# Patient Record
Sex: FEMALE | Race: WHITE | NOT HISPANIC OR LATINO
[De-identification: names, ages, dates, MRNs, and addresses within clinical notes are randomized per-mention and may not be internally consistent; named-entity substitution may affect disease eponyms.]

---

## 2024-01-25 ENCOUNTER — APPOINTMENT (OUTPATIENT)
Dept: OBGYN | Facility: CLINIC | Age: 49
End: 2024-01-25
Payer: COMMERCIAL

## 2024-01-25 VITALS
SYSTOLIC BLOOD PRESSURE: 133 MMHG | BODY MASS INDEX: 35.03 KG/M2 | HEIGHT: 66 IN | DIASTOLIC BLOOD PRESSURE: 86 MMHG | HEART RATE: 80 BPM | WEIGHT: 218 LBS

## 2024-01-25 DIAGNOSIS — Z80.0 FAMILY HISTORY OF MALIGNANT NEOPLASM OF DIGESTIVE ORGANS: ICD-10-CM

## 2024-01-25 DIAGNOSIS — Z87.891 PERSONAL HISTORY OF NICOTINE DEPENDENCE: ICD-10-CM

## 2024-01-25 DIAGNOSIS — Z80.3 FAMILY HISTORY OF MALIGNANT NEOPLASM OF BREAST: ICD-10-CM

## 2024-01-25 DIAGNOSIS — Z80.6 FAMILY HISTORY OF LEUKEMIA: ICD-10-CM

## 2024-01-25 DIAGNOSIS — E66.9 OBESITY, UNSPECIFIED: ICD-10-CM

## 2024-01-25 DIAGNOSIS — Z13.71 ENCOUNTER FOR NONPROCREATIVE SCREENING FOR GENETIC DISEASE CARRIER STATUS: ICD-10-CM

## 2024-01-25 PROBLEM — Z00.00 ENCOUNTER FOR PREVENTIVE HEALTH EXAMINATION: Status: ACTIVE | Noted: 2024-01-25

## 2024-01-25 PROCEDURE — 99204 OFFICE O/P NEW MOD 45 MIN: CPT

## 2024-03-04 ENCOUNTER — APPOINTMENT (OUTPATIENT)
Dept: OBGYN | Facility: CLINIC | Age: 49
End: 2024-03-04
Payer: COMMERCIAL

## 2024-03-04 PROCEDURE — 99213 OFFICE O/P EST LOW 20 MIN: CPT

## 2024-03-04 NOTE — DISCUSSION/SUMMARY
[FreeTextEntry1] : 49 yo P2 here for preop chat before to lsc b/l ov cystectomy and b/l salpingectomy. Case reviewed. Spoke about likelihood of removing one ovary and doing cystectomy on the contralateral side, assuming no suspicious findings. Would still do the b/l salpingectomy. Questions answered.  Plan Keep appt for surgery and PST

## 2024-03-04 NOTE — HISTORY OF PRESENT ILLNESS
[Other Location: e.g. School (Enter Location, City,State)___] : at [unfilled], at the time of the visit. [Medical Office: ([facility])___] : at the medical office located in  [Verbal consent obtained from patient] : the patient, [unfilled] [FreeTextEntry1] : 47 yo P2 here for preop chat before to lsc b/l ov cystectomy and b/l salpingectomy.

## 2024-03-06 ENCOUNTER — OUTPATIENT (OUTPATIENT)
Dept: OUTPATIENT SERVICES | Facility: HOSPITAL | Age: 49
LOS: 1 days | End: 2024-03-06

## 2024-03-06 VITALS
OXYGEN SATURATION: 99 % | SYSTOLIC BLOOD PRESSURE: 108 MMHG | HEART RATE: 76 BPM | DIASTOLIC BLOOD PRESSURE: 74 MMHG | HEIGHT: 65 IN | TEMPERATURE: 98 F | RESPIRATION RATE: 16 BRPM | WEIGHT: 205.91 LBS

## 2024-03-06 DIAGNOSIS — Z98.890 OTHER SPECIFIED POSTPROCEDURAL STATES: Chronic | ICD-10-CM

## 2024-03-06 DIAGNOSIS — N83.201 UNSPECIFIED OVARIAN CYST, RIGHT SIDE: ICD-10-CM

## 2024-03-06 DIAGNOSIS — Z98.51 TUBAL LIGATION STATUS: Chronic | ICD-10-CM

## 2024-03-06 LAB
BLD GP AB SCN SERPL QL: NEGATIVE — SIGNIFICANT CHANGE UP
RH IG SCN BLD-IMP: POSITIVE — SIGNIFICANT CHANGE UP

## 2024-03-06 RX ORDER — SODIUM CHLORIDE 9 MG/ML
1000 INJECTION, SOLUTION INTRAVENOUS
Refills: 0 | Status: DISCONTINUED | OUTPATIENT
Start: 2024-03-12 | End: 2024-03-26

## 2024-03-06 NOTE — H&P PST ADULT - PROBLEM SELECTOR PLAN 1
Patient tentatively scheduled for laparoscopic bilateral ovarian cystectomy on 3/12/24.  Pre-op instructions provided. Pt given verbal and written instructions with teach back on chlorhexidine wash and pepcid. Pt verbalized understanding with return demonstration.     CBC, CMP in chart from 1/12/24 - WNL  Recent Echo and stress results requested.

## 2024-03-06 NOTE — H&P PST ADULT - PROBLEM SELECTOR PLAN 1
Patient tentatively scheduled for laparoscopic bilateral ovarian cystectomy on 3/12/24.  Pre-op instructions provided. Pt given verbal and written instructions with teach back on chlorhexidine wash and pepcid. Pt verbalized understanding with return demonstration.   Urine cup provided for day of procedure for pregnancy test.

## 2024-03-06 NOTE — H&P PST ADULT - HISTORY OF PRESENT ILLNESS
49 year old female with PMH of HTN, HLD, Type 2 DM, hypothyroidism, Asthma, COPD , CAD, presents to Presurgical testing with diagnosis of unspecified ovarian cyst, right side   scheduled for laparoscopic bilateral ovarian cystectomy.     USG finding of ovarian cyst. (Rt side looked "dense").  MRI showed b/l ovaries w/densities. 49 year old female with no PMH presents to Presurgical testing with diagnosis of unspecified ovarian cyst, right side  scheduled for laparoscopic bilateral ovarian cystectomy.     USG finding of ovarian cyst. (Rt side looked "dense").  MRI showed b/l ovaries w/densities.

## 2024-03-06 NOTE — H&P PST ADULT - HISTORY OF PRESENT ILLNESS
48 year old female with no PMH presents to Presurgical testing with diagnosis of unspecified ovarian cyst , right side scheduled for laparoscopic bilateral ovarian cystectomy.     USG finding of ovarian cyst. (Rt side looked "dense").  MRI showed b/l ovaries w/densities.

## 2024-03-06 NOTE — H&P PST ADULT - NSICDXPASTSURGICALHX_GEN_ALL_CORE_FT
PAST SURGICAL HISTORY:  H/O eye surgery     H/O tubal ligation     History of endometrial ablation

## 2024-03-06 NOTE — H&P PST ADULT - NSANTHOSAYNRD_GEN_A_CORE
No. LISBET screening performed.  STOP BANG Legend: 0-2 = LOW Risk; 3-4 = INTERMEDIATE Risk; 5-8 = HIGH Risk

## 2024-03-07 LAB — RH IG SCN BLD-IMP: POSITIVE — SIGNIFICANT CHANGE UP

## 2024-03-11 ENCOUNTER — TRANSCRIPTION ENCOUNTER (OUTPATIENT)
Age: 49
End: 2024-03-11

## 2024-03-11 ENCOUNTER — RESULT REVIEW (OUTPATIENT)
Age: 49
End: 2024-03-11

## 2024-03-11 NOTE — ASU PATIENT PROFILE, ADULT - NS SC CAGE ALCOHOL EYE OPENER
[no

## 2024-03-11 NOTE — ASU PATIENT PROFILE, ADULT - BRADEN SCORE (IF 18 OR LESS ACTIVATE SKIN INJURY RISK INCREASED GUIDELINE), MLM
Well Child Visit at 7 to 8 Years   AMBULATORY CARE:   A well child visit  is when your child sees a healthcare provider to prevent health problems  Well child visits are used to track your child's growth and development  It is also a time for you to ask questions and to get information on how to keep your child safe  Write down your questions so you remember to ask them  Your child should have regular well child visits from birth to 16 years  Development milestones your child may reach at 7 to 8 years:  Each child develops at his or her own pace  Your child might have already reached the following milestones, or he or she may reach them later:  · Lose baby teeth and grow in adult teeth    · Develop friendships and a best friend    · Help with tasks such as setting the table    · Tell time on a face clock     · Know days and months    · Ride a bicycle or play sports    · Start reading on his or her own and solving math problems    Help your child get the right nutrition:       · Teach your child about a healthy meal plan by setting a good example  Buy healthy foods for your family  Eat healthy meals together as a family as often as possible  Talk with your child about why it is important to choose healthy foods  · Provide a variety of fruits and vegetables  Half of your child's plate should contain fruits and vegetables  He or she should eat about 5 servings of fruits and vegetables each day  Buy fresh, canned, or dried fruit instead of fruit juice as often as possible  Offer more dark green, red, and orange vegetables  Dark green vegetables include broccoli, spinach, elly lettuce, and venkatesh greens  Examples of orange and red vegetables are carrots, sweet potatoes, winter squash, and red peppers  · Make sure your child has a healthy breakfast every day  Breakfast can help your child learn and focus better in school  · Limit foods that contain sugar and are low in healthy nutrients    Limit candy, soda, fast food, and salty snacks  Do not give your child fruit drinks  Limit 100% juice to 4 to 6 ounces each day  · Teach your child how to make healthy food choices  A healthy lunch may include a sandwich with lean meat, cheese, or peanut butter  It could also include a fruit, vegetable, and milk  Pack healthy foods if your child takes his or her own lunch to school  Pack baby carrots or pretzels instead of potato chips in your child's lunch box  You can also add fruit or low-fat yogurt instead of cookies  Keep your child's lunch cold with an ice pack so that it does not spoil  · Make sure your child gets enough calcium  Calcium is needed to build strong bones and teeth  Children need about 2 to 3 servings of dairy each day to get enough calcium  Good sources of calcium are low-fat dairy foods (milk, cheese, and yogurt)  A serving of dairy is 8 ounces of milk or yogurt, or 1½ ounces of cheese  Other foods that contain calcium include tofu, kale, spinach, broccoli, almonds, and calcium-fortified orange juice  Ask your child's healthcare provider for more information about the serving sizes of these foods  · Provide whole-grain foods  Half of the grains your child eats each day should be whole grains  Whole grains include brown rice, whole-wheat pasta, and whole-grain cereals and breads  · Provide lean meats, poultry, fish, and other healthy protein foods  Other healthy protein foods include legumes (such as beans), soy foods (such as tofu), and peanut butter  Bake, broil, and grill meat instead of frying it to reduce the amount of fat  · Use healthy fats to prepare your child's food  A healthy fat is unsaturated fat  It is found in foods such as soybean, canola, olive, and sunflower oils  It is also found in soft tub margarine that is made with liquid vegetable oil  Limit unhealthy fats such as saturated fat, trans fat, and cholesterol   These are found in shortening, butter, stick margarine, and animal fat  · Let your child decide how much to eat  Give your child small portions  Let your child have another serving if he or she asks for one  Your child will be very hungry on some days and want to eat more  For example, your child may want to eat more on days when he or she is more active  Your child may also eat more if he or she is going through a growth spurt  There may be days when your child eats less than usual      Help your  for his or her teeth:   · Remind your child to brush his or her teeth 2 times each day  Also, have your child floss once every day  Mouth care prevents infection, plaque, bleeding gums, mouth sores, and cavities  It also freshens breath and improves appetite  Brush, floss, and use mouthwash  Ask your child's dentist which mouthwash is best for you to use  · Take your child to the dentist at least 2 times each year  A dentist can check for problems with his or her teeth or gums, and provide treatments to protect his or her teeth  · Encourage your child to wear a mouth guard during sports  This will protect his or her teeth from injury  Make sure the mouth guard fits correctly  Ask your child's healthcare provider for more information on mouth guards  Keep your child safe:   · Have your child ride in a booster seat  and make sure everyone in your car wears a seatbelt  ? Children aged 9 to 8 years should ride in a booster car seat in the back seat  ? Booster seats come with and without a seat back  Your child will be secured in the booster seat with the regular seatbelt in your car     ? Your child must stay in the booster car seat until he or she is between 6and 15years old and 4 foot 9 inches (57 inches) tall  This is when a regular seatbelt should fit your child properly without the booster seat  ? Your child should remain in a forward-facing car seat if you only have a lap belt seatbelt in your car   Some forward-facing car seats hold children who weigh more than 40 pounds  The harness on the forward-facing car seat will keep your child safer and more secure than a lap belt and booster seat  · Encourage your child to use safety equipment  Encourage him or her to wear helmets, protective sports gear, and life jackets  · Teach your child how to swim  Even if your child knows how to swim, do not let him or her play around water alone  An adult needs to be present and watching at all times  Make sure your child wears a safety vest when on a boat  · Put sunscreen on your child before he or she goes outside to play or swim  Use sunscreen with a SPF 15 or higher  Use as directed  Apply sunscreen at least 15 minutes before going outside  Reapply sunscreen every 2 hours when outside  · Remind your child how to cross the street safely  Remind your child to stop at the curb, look left, then look right, and left again  Tell your child to never cross the street without a grownup  Teach your child where the school bus will  and let off  Always have adult supervision at your child's bus stop  · Store and lock all guns and weapons  Make sure all guns are unloaded before you store them  Make sure your child cannot reach or find where weapons are kept  Never  leave a loaded gun unattended  · Remind your child about emergency safety  Be sure your child knows what to do in case of a fire or other emergency  Teach your child how to call 911  · Talk to your child about personal safety without making him or her anxious  Teach your child that no one has the right to touch his or her private parts  Also explain that no one should ask your child to touch their private parts  Let your child know that he or she should tell you even if he or she is told not to  Support your child:   · Encourage your child to get 1 hour of physical activity each day    Examples of physical activities include sports, running, walking, swimming, and riding bikes  The hour of physical activity does not need to be done all at once  It can be done in shorter blocks of time  · Limit your child's screen time  Screen time is the amount of television, computer, smart phone, and video game time your child has each day  It is important to limit screen time  This helps your child get enough sleep, physical activity, and social interaction each day  Your child's pediatrician can help you create a screen time plan  The daily limit is usually 1 hour for children 2 to 5 years  The daily limit is usually 2 hours for children 6 years or older  You can also set limits on the kinds of devices your child can use, and where he or she can use them  Keep the plan where your child and anyone who takes care of him or her can see it  Create a plan for each child in your family  You can also go to [employer]/English/[employer]/Pages/default  aspx#planview for more help creating a plan  · Encourage your child to talk about school every day  Talk to your child about the good and bad things that may have happened during the school day  Encourage your child to tell you or a teacher if someone is being mean to him or her  Talk to your child's teacher about help or tutoring if your child is not doing well in school  · Help your child feel confident and secure  Give your child hugs and encouragement  Do activities together  Help him or her do tasks independently  Praise your child when he or she does tasks and activities well  Do not hit, shake, or spank your child  Set boundaries and reasonable consequences when rules are broken  Teach your child about acceptable behaviors  What you need to know about your child's next well child visit:  Your child's healthcare provider will tell you when to bring him or her in again  The next well child visit is usually at 9 to 10 years   Contact your child's healthcare provider if you have questions or concerns about your child's health or care before the next visit  Your child may need vaccines at the next well child visit  Your provider will tell you which vaccines your child needs and when your child should get them  © Copyright [address] Information is for End User's use only and may not be sold, redistributed or otherwise used for commercial purposes  All illustrations and images included in CareNotes® are the copyrighted property of [employer]  or [facility] Amanda Cruz  The above information is an  only  It is not intended as medical advice for individual conditions or treatments  Talk to your doctor, nurse or pharmacist before following any medical regimen to see if it is safe and effective for you  23

## 2024-03-11 NOTE — ASU PATIENT PROFILE, ADULT - NS PREOP UNDERSTANDS INFO
I don't recommend using Zolpidem for sleeping.  Radha Rosas MD  [facility]  [phone]     yes

## 2024-03-12 ENCOUNTER — TRANSCRIPTION ENCOUNTER (OUTPATIENT)
Age: 49
End: 2024-03-12

## 2024-03-12 ENCOUNTER — OUTPATIENT (OUTPATIENT)
Dept: OUTPATIENT SERVICES | Facility: HOSPITAL | Age: 49
LOS: 1 days | Discharge: HOME | End: 2024-03-12
Payer: COMMERCIAL

## 2024-03-12 ENCOUNTER — RESULT REVIEW (OUTPATIENT)
Age: 49
End: 2024-03-12

## 2024-03-12 ENCOUNTER — APPOINTMENT (OUTPATIENT)
Dept: OBGYN | Facility: HOSPITAL | Age: 49
End: 2024-03-12

## 2024-03-12 VITALS
SYSTOLIC BLOOD PRESSURE: 137 MMHG | RESPIRATION RATE: 16 BRPM | HEART RATE: 81 BPM | DIASTOLIC BLOOD PRESSURE: 71 MMHG | OXYGEN SATURATION: 100 %

## 2024-03-12 VITALS
WEIGHT: 205.91 LBS | SYSTOLIC BLOOD PRESSURE: 134 MMHG | HEART RATE: 79 BPM | RESPIRATION RATE: 17 BRPM | TEMPERATURE: 98 F | OXYGEN SATURATION: 96 % | DIASTOLIC BLOOD PRESSURE: 78 MMHG | HEIGHT: 65 IN

## 2024-03-12 DIAGNOSIS — N83.201 UNSPECIFIED OVARIAN CYST, RIGHT SIDE: ICD-10-CM

## 2024-03-12 DIAGNOSIS — Z98.890 OTHER SPECIFIED POSTPROCEDURAL STATES: Chronic | ICD-10-CM

## 2024-03-12 DIAGNOSIS — Z98.51 TUBAL LIGATION STATUS: Chronic | ICD-10-CM

## 2024-03-12 PROCEDURE — 58662 LAPAROSCOPY EXCISE LESIONS: CPT

## 2024-03-12 PROCEDURE — 88112 CYTOPATH CELL ENHANCE TECH: CPT | Mod: 26

## 2024-03-12 PROCEDURE — 88307 TISSUE EXAM BY PATHOLOGIST: CPT | Mod: 26

## 2024-03-12 PROCEDURE — 88302 TISSUE EXAM BY PATHOLOGIST: CPT | Mod: 26

## 2024-03-12 PROCEDURE — 88305 TISSUE EXAM BY PATHOLOGIST: CPT | Mod: 26

## 2024-03-12 PROCEDURE — 58661 LAPAROSCOPY REMOVE ADNEXA: CPT

## 2024-03-12 DEVICE — SURGICEL POWDER 3 GRAMS: Type: IMPLANTABLE DEVICE | Status: FUNCTIONAL

## 2024-03-12 RX ORDER — OXYCODONE HYDROCHLORIDE 5 MG/1
5 TABLET ORAL ONCE
Refills: 0 | Status: DISCONTINUED | OUTPATIENT
Start: 2024-03-12 | End: 2024-03-12

## 2024-03-12 RX ORDER — ERGOCALCIFEROL 1.25 MG/1
0 CAPSULE ORAL
Refills: 0 | DISCHARGE

## 2024-03-12 RX ORDER — OXYCODONE HYDROCHLORIDE 5 MG/1
5 TABLET ORAL
Qty: 40 | Refills: 0
Start: 2024-03-12 | End: 2024-03-13

## 2024-03-12 RX ORDER — SODIUM CHLORIDE 9 MG/ML
1000 INJECTION, SOLUTION INTRAVENOUS
Refills: 0 | Status: DISCONTINUED | OUTPATIENT
Start: 2024-03-12 | End: 2024-03-26

## 2024-03-12 RX ORDER — LORATADINE 10 MG/1
1 TABLET ORAL
Refills: 0 | DISCHARGE

## 2024-03-12 RX ORDER — FENTANYL CITRATE 50 UG/ML
50 INJECTION INTRAVENOUS
Refills: 0 | Status: DISCONTINUED | OUTPATIENT
Start: 2024-03-12 | End: 2024-03-12

## 2024-03-12 RX ORDER — IBUPROFEN 200 MG
30 TABLET ORAL
Qty: 2520 | Refills: 0
Start: 2024-03-12 | End: 2024-04-01

## 2024-03-12 RX ORDER — ONDANSETRON 8 MG/1
4 TABLET, FILM COATED ORAL ONCE
Refills: 0 | Status: COMPLETED | OUTPATIENT
Start: 2024-03-12 | End: 2024-03-12

## 2024-03-12 RX ADMIN — FENTANYL CITRATE 50 MICROGRAM(S): 50 INJECTION INTRAVENOUS at 15:30

## 2024-03-12 RX ADMIN — FENTANYL CITRATE 50 MICROGRAM(S): 50 INJECTION INTRAVENOUS at 16:30

## 2024-03-12 RX ADMIN — OXYCODONE HYDROCHLORIDE 5 MILLIGRAM(S): 5 TABLET ORAL at 15:50

## 2024-03-12 RX ADMIN — FENTANYL CITRATE 50 MICROGRAM(S): 50 INJECTION INTRAVENOUS at 15:07

## 2024-03-12 RX ADMIN — OXYCODONE HYDROCHLORIDE 5 MILLIGRAM(S): 5 TABLET ORAL at 16:30

## 2024-03-12 RX ADMIN — FENTANYL CITRATE 50 MICROGRAM(S): 50 INJECTION INTRAVENOUS at 15:50

## 2024-03-12 RX ADMIN — ONDANSETRON 4 MILLIGRAM(S): 8 TABLET, FILM COATED ORAL at 17:29

## 2024-03-12 NOTE — CHART NOTE - NSCHARTNOTEFT_GEN_A_CORE
POST-OP CHECK NOTE   - @ 4552    SUBJECTIVE:    50yo F now POD0 s/p Lsc LSO, RS, and Rt ovarian cystectomy. Pain controlled. She is not yet out of bed. Patient has yet to void. Tolerating sips of clear liquids w/o n/v. Denies fevers, chills, n/v, chest pain, or shortness of breath     fentaNYL    Injectable 50 MICROGram(s) IV Push every 10 minutes PRN  lactated ringers. 1000 milliLiter(s) IV Continuous <Continuous>  lactated ringers. 1000 milliLiter(s) IV Continuous <Continuous>  ondansetron Injectable 4 milliGRAM(s) IV Push once PRN      OBJECTIVE:    VITAL SIGNS:  Vital Signs Last 24 Hrs  T(C): 36.2 (12 Mar 2024 16:00), Max: 36.9 (12 Mar 2024 09:27)  T(F): 97.2 (12 Mar 2024 16:00), Max: 98.4 (12 Mar 2024 09:27)  HR: 86 (12 Mar 2024 16:30) (79 - 103)  BP: 125/78 (12 Mar 2024 16:30) (116/65 - 134/78)  BP(mean): 89 (12 Mar 2024 16:30) (77 - 90)  RR: 15 (12 Mar 2024 16:30) (12 - 20)  SpO2: 96% (12 Mar 2024 16:30) (94% - 97%)    Parameters below as of 12 Mar 2024 16:00  Patient On (Oxygen Delivery Method): room air      CAPILLARY BLOOD GLUCOSE          03-12-24 @ 07:01  -  03-12-24 @ 16:40  --------------------------------------------------------  IN: 370 mL / OUT: 0 mL / NET: 370 mL        PHYSICAL EXAM:  GEN: No acute distress. Awake. Alert   CV: Regular rate and rhythm on bedside monitor   LUNGS: Unlabored breathing. No respiratory distress  ABD: Soft. Non-tender. Non-distended   Incision: Lsc sites c/d/i w/ overlying steri-strips   EXT: No calf tenderness bilaterally    LABS:            ASSESSMENT:  50yo F now POD0 s/p Lsc LSO, RS, and Rt ovarian cystectomy. Patient is stable and progressing normally postoperatively.    NEURO: Pain controlled on current regimen  CV: Hemodynamically stable  PULM: Saturating well on RA  GI: Advance diet as tolerated. Zofran PRN  : Due to void   HEME: SCDs. Early ambulation   ID: Afebrile  Dispo: Home once meeting post-operatively milestones     Gino Campbell, PGY-2  Obstetrics and Gynecology    d/w Dr. ELDA Durbin

## 2024-03-12 NOTE — ASU DISCHARGE PLAN (ADULT/PEDIATRIC) - NS MD DC FALL RISK RISK
For information on Fall & Injury Prevention, visit: [email or web address] OR  [email or web address] OR  https://www.cdc.gov/steadi/patient.html

## 2024-03-12 NOTE — BRIEF OPERATIVE NOTE - NSICDXBRIEFPROCEDURE_GEN_ALL_CORE_FT
PROCEDURES:  Left salpingoophorectomy 12-Mar-2024 14:44:04  Sparkle Salazar  Right salpingectomy 12-Mar-2024 14:44:34  Sparkle Salazar  Laparoscopic right ovarian cystectomy 12-Mar-2024 14:44:45  Sparkle Salazar

## 2024-03-12 NOTE — ASU DISCHARGE PLAN (ADULT/PEDIATRIC) - FOLLOW UP APPOINTMENTS
304 may also call Recovery Room (PACU) 24/7 @ [phone]/[facility]

## 2024-03-12 NOTE — BRIEF OPERATIVE NOTE - OPERATION/FINDINGS
Left ovary with approx 3cm solid appearing cyst. Right ovary with two 2cm simple cysts filled with clear fluid. Grossly normal fallopian tubes and uterus.    Surgicel powder placed over incisions Left ovary with approx 3cm solid appearing cyst. Right ovary with two 2cm simple cysts filled with clear fluid. Grossly normal uterus. B/l fallopian tubes with surgically absent portion b/l    Surgicel powder placed over incisions

## 2024-03-12 NOTE — ASU DISCHARGE PLAN (ADULT/PEDIATRIC) - ASU DC SPECIAL INSTRUCTIONSFT
Postoperative Instructions      Pain control     For pain control, take the followin. Motrin 600mg four times a day, take with food  2. Add Tylenol 650 four times a day, alternated with motrin  3. Add oxycodone as needed for severe pain not managed well by motrin and tylenol. A prescription has been sent to your pharmacy on file.     Motrin and Tylenol can be obtained over the counter.    Postoperative restrictions   Do not drive or make important decisions for 24 hours after anesthesia. You may feel drowsy for 24 hours and should have a responsible adult with you during that time. Nothing in the vagina (tampons, sexual intercourse), No tub baths, pools or hot tubs for 6 weeks (showers are ok!). No lifting anything heavier than 15 lbs, no strenuous exercise for 6 weeks after surgery. Do not pull or cut any stitches that you see around your incision.         Vaginal bleeding   Spotting per vagina is normal in first few days after surgery. If you are soaking 1 pad per hour, that is not normal and you should notify your doctor's office and seek medical attention right away.        Signs of Infection  Some postoperative pain and discomfort is normal. However, if you experience any of the following, you may be developing an infection and should be seen by your doctor: pain that does not get better with the oral medications listed above, fever greater than 100.4F, foul smelling discharge coming from the surgical site, nausea and vomiting that does not stop (especially if you are unable to tolerate oral intake), or inability to urinate. If you experience any of these symptoms, call your doctor's office to be seen right away.    Follow Up  Call your doctor's office to schedule a postoperative appointment in 2 weeks. The results from the procedure will be discussed with you at that time. Postoperative Instructions      Pain control     For pain control, take the followin. Motrin 600mg four times a day, take with food  2. Add Tylenol 650 four times a day, alternated with motrin  3. Add oxycodone as needed for severe pain not managed well by motrin and tylenol. A prescription has been sent to your pharmacy on file.     You were given 1000mg IV Tylenol for pain management.  Please DO NOT take any Tylenol containing products, such as  Vicodin, Percocet, Excedrin, many cold preparations for the next 6 hours (until 730p).  DO NOT EXCEED 3000MG OF TYLENOL OVER 24 HOURS.   Motrin and Tylenol can be obtained over the counter.    You were given Toradol for pain management. Please DO Not take Motrin/Ibuprofen/Advil/Aleve (NSAIDS) for the next 6 hours (Until 730pm)     Postoperative restrictions   Do not drive or make important decisions for 24 hours after anesthesia. You may feel drowsy for 24 hours and should have a responsible adult with you during that time. Nothing in the vagina (tampons, sexual intercourse), No tub baths, pools or hot tubs for 6 weeks (showers are ok!). No lifting anything heavier than 15 lbs, no strenuous exercise for 6 weeks after surgery. Do not pull or cut any stitches that you see around your incision.         Vaginal bleeding   Spotting per vagina is normal in first few days after surgery. If you are soaking 1 pad per hour, that is not normal and you should notify your doctor's office and seek medical attention right away.        Signs of Infection  Some postoperative pain and discomfort is normal. However, if you experience any of the following, you may be developing an infection and should be seen by your doctor: pain that does not get better with the oral medications listed above, fever greater than 100.4F, foul smelling discharge coming from the surgical site, nausea and vomiting that does not stop (especially if you are unable to tolerate oral intake), or inability to urinate. If you experience any of these symptoms, call your doctor's office to be seen right away.    Follow Up  Call your doctor's office to schedule a postoperative appointment in 2 weeks. The results from the procedure will be discussed with you at that time.

## 2024-03-12 NOTE — ASU DISCHARGE PLAN (ADULT/PEDIATRIC) - CARE PROVIDER_API CALL
John Durbin  Obstetrics and Gynecology  [address] [place] [address]  Phone: [phone]  Fax: [phone]  Follow Up Time:

## 2024-03-15 LAB — NON-GYNECOLOGICAL CYTOLOGY STUDY: SIGNIFICANT CHANGE UP

## 2024-03-21 LAB — SURGICAL PATHOLOGY STUDY: SIGNIFICANT CHANGE UP

## 2024-03-28 ENCOUNTER — APPOINTMENT (OUTPATIENT)
Dept: OBGYN | Facility: CLINIC | Age: 49
End: 2024-03-28
Payer: COMMERCIAL

## 2024-03-28 VITALS
BODY MASS INDEX: 33.75 KG/M2 | HEART RATE: 79 BPM | WEIGHT: 210 LBS | DIASTOLIC BLOOD PRESSURE: 82 MMHG | SYSTOLIC BLOOD PRESSURE: 132 MMHG | HEIGHT: 66 IN

## 2024-03-28 DIAGNOSIS — N83.202 UNSPECIFIED OVARIAN CYST, RIGHT SIDE: ICD-10-CM

## 2024-03-28 DIAGNOSIS — N83.201 UNSPECIFIED OVARIAN CYST, RIGHT SIDE: ICD-10-CM

## 2024-03-28 PROCEDURE — 99024 POSTOP FOLLOW-UP VISIT: CPT

## 2024-03-28 NOTE — PHYSICAL EXAM
[Chaperone Present] : A chaperone was present in the examining room during all aspects of the physical examination [FreeTextEntry1] : Ivana [FreeTextEntry7] : Soft, NT, [employer] c/d/i

## 2024-03-28 NOTE — DISCUSSION/SUMMARY
[FreeTextEntry1] : 48 yo P s/p lsc LSO, RS, RO cyst 3/12/24 for benign serous cystadenoma. No major complaints. Pt recovering well. Path benign.  Plan May consider USG in 6 months for surveillance Send letter to Dr. Arnaldo Devi  [phone]
